# Patient Record
Sex: MALE | Race: BLACK OR AFRICAN AMERICAN | NOT HISPANIC OR LATINO | Employment: FULL TIME | URBAN - METROPOLITAN AREA
[De-identification: names, ages, dates, MRNs, and addresses within clinical notes are randomized per-mention and may not be internally consistent; named-entity substitution may affect disease eponyms.]

---

## 2021-02-10 ENCOUNTER — HOSPITAL ENCOUNTER (OUTPATIENT)
Dept: URGENT CARE | Facility: CLINIC | Age: 46
Discharge: HOME | End: 2021-02-10
Attending: FAMILY MEDICINE

## 2021-06-23 ENCOUNTER — HOSPITAL ENCOUNTER (EMERGENCY)
Facility: HOSPITAL | Age: 46
Discharge: HOME | End: 2021-06-23
Attending: EMERGENCY MEDICINE | Admitting: EMERGENCY MEDICINE

## 2021-06-23 VITALS
BODY MASS INDEX: 31.34 KG/M2 | HEART RATE: 65 BPM | RESPIRATION RATE: 20 BRPM | OXYGEN SATURATION: 100 % | SYSTOLIC BLOOD PRESSURE: 130 MMHG | DIASTOLIC BLOOD PRESSURE: 77 MMHG | WEIGHT: 265.43 LBS | TEMPERATURE: 98.1 F | HEIGHT: 77 IN

## 2021-06-23 DIAGNOSIS — M54.41 CHRONIC RIGHT-SIDED LOW BACK PAIN WITH RIGHT-SIDED SCIATICA: Primary | ICD-10-CM

## 2021-06-23 DIAGNOSIS — G89.29 CHRONIC RIGHT-SIDED LOW BACK PAIN WITH RIGHT-SIDED SCIATICA: Primary | ICD-10-CM

## 2021-06-23 PROCEDURE — 25010000002 METHYLPREDNISOLONE PER 125 MG: Performed by: NURSE PRACTITIONER

## 2021-06-23 PROCEDURE — 25010000002 HYDROMORPHONE 1 MG/ML SOLUTION: Performed by: NURSE PRACTITIONER

## 2021-06-23 PROCEDURE — 96372 THER/PROPH/DIAG INJ SC/IM: CPT

## 2021-06-23 PROCEDURE — 99282 EMERGENCY DEPT VISIT SF MDM: CPT

## 2021-06-23 RX ORDER — TIZANIDINE 4 MG/1
4 TABLET ORAL NIGHTLY PRN
COMMUNITY
End: 2022-05-23 | Stop reason: SDUPTHER

## 2021-06-23 RX ORDER — HYDROCODONE BITARTRATE AND ACETAMINOPHEN 5; 325 MG/1; MG/1
1-2 TABLET ORAL EVERY 6 HOURS PRN
Qty: 10 TABLET | Refills: 0 | Status: CANCELLED | OUTPATIENT
Start: 2021-06-23

## 2021-06-23 RX ORDER — PREDNISONE 50 MG/1
50 TABLET ORAL DAILY
Qty: 5 TABLET | Refills: 0 | Status: SHIPPED | OUTPATIENT
Start: 2021-06-23 | End: 2022-07-19

## 2021-06-23 RX ORDER — PREDNISONE 20 MG/1
40 TABLET ORAL DAILY
Qty: 10 TABLET | Refills: 0 | Status: CANCELLED | OUTPATIENT
Start: 2021-06-23

## 2021-06-23 RX ORDER — DICLOFENAC SODIUM AND MISOPROSTOL 50; 200 MG/1; UG/1
1 TABLET, DELAYED RELEASE ORAL 2 TIMES DAILY
COMMUNITY
End: 2023-01-20

## 2021-06-23 RX ORDER — METHYLPREDNISOLONE SODIUM SUCCINATE 125 MG/2ML
125 INJECTION, POWDER, LYOPHILIZED, FOR SOLUTION INTRAMUSCULAR; INTRAVENOUS ONCE
Status: COMPLETED | OUTPATIENT
Start: 2021-06-23 | End: 2021-06-23

## 2021-06-23 RX ORDER — HYDROCODONE BITARTRATE AND ACETAMINOPHEN 7.5; 325 MG/1; MG/1
1 TABLET ORAL EVERY 6 HOURS PRN
Qty: 12 TABLET | Refills: 0 | Status: SHIPPED | OUTPATIENT
Start: 2021-06-23 | End: 2022-07-19

## 2021-06-23 RX ADMIN — HYDROMORPHONE HYDROCHLORIDE 1 MG: 1 INJECTION, SOLUTION INTRAMUSCULAR; INTRAVENOUS; SUBCUTANEOUS at 21:53

## 2021-06-23 RX ADMIN — METHYLPREDNISOLONE SODIUM SUCCINATE 125 MG: 125 INJECTION, POWDER, FOR SOLUTION INTRAMUSCULAR; INTRAVENOUS at 21:54

## 2022-05-23 ENCOUNTER — HOSPITAL ENCOUNTER (EMERGENCY)
Facility: HOSPITAL | Age: 47
Discharge: HOME | End: 2022-05-23
Attending: EMERGENCY MEDICINE | Admitting: EMERGENCY MEDICINE

## 2022-05-23 VITALS
HEIGHT: 75 IN | BODY MASS INDEX: 34.57 KG/M2 | RESPIRATION RATE: 18 BRPM | TEMPERATURE: 97.8 F | HEART RATE: 91 BPM | SYSTOLIC BLOOD PRESSURE: 123 MMHG | OXYGEN SATURATION: 100 % | DIASTOLIC BLOOD PRESSURE: 74 MMHG | WEIGHT: 278 LBS

## 2022-05-23 DIAGNOSIS — G89.29 CHRONIC BILATERAL LOW BACK PAIN WITHOUT SCIATICA: Primary | ICD-10-CM

## 2022-05-23 DIAGNOSIS — M54.50 CHRONIC BILATERAL LOW BACK PAIN WITHOUT SCIATICA: Primary | ICD-10-CM

## 2022-05-23 PROCEDURE — 25010000002 DEXAMETHASONE PER 1 MG: Performed by: NURSE PRACTITIONER

## 2022-05-23 PROCEDURE — 25010000002 KETOROLAC TROMETHAMINE PER 15 MG: Performed by: NURSE PRACTITIONER

## 2022-05-23 PROCEDURE — 96372 THER/PROPH/DIAG INJ SC/IM: CPT

## 2022-05-23 PROCEDURE — 99282 EMERGENCY DEPT VISIT SF MDM: CPT

## 2022-05-23 RX ORDER — DEXAMETHASONE SODIUM PHOSPHATE 10 MG/ML
10 INJECTION INTRAMUSCULAR; INTRAVENOUS ONCE
Status: COMPLETED | OUTPATIENT
Start: 2022-05-23 | End: 2022-05-23

## 2022-05-23 RX ORDER — METHYLPREDNISOLONE 4 MG/1
TABLET ORAL
Qty: 21 TABLET | Refills: 0 | Status: SHIPPED | OUTPATIENT
Start: 2022-05-23 | End: 2022-07-19

## 2022-05-23 RX ORDER — LIDOCAINE 50 MG/G
1 PATCH TOPICAL EVERY 24 HOURS
Qty: 5 PATCH | Refills: 0 | Status: SHIPPED | OUTPATIENT
Start: 2022-05-23 | End: 2022-07-19

## 2022-05-23 RX ORDER — TIZANIDINE 4 MG/1
4 TABLET ORAL NIGHTLY PRN
Qty: 20 TABLET | Refills: 0 | Status: SHIPPED | OUTPATIENT
Start: 2022-05-23 | End: 2022-07-19

## 2022-05-23 RX ORDER — KETOROLAC TROMETHAMINE 10 MG/1
10 TABLET, FILM COATED ORAL EVERY 6 HOURS PRN
Qty: 15 TABLET | Refills: 0 | Status: SHIPPED | OUTPATIENT
Start: 2022-05-23 | End: 2022-07-19

## 2022-05-23 RX ORDER — KETOROLAC TROMETHAMINE 30 MG/ML
60 INJECTION, SOLUTION INTRAMUSCULAR; INTRAVENOUS ONCE
Status: COMPLETED | OUTPATIENT
Start: 2022-05-23 | End: 2022-05-23

## 2022-05-23 RX ADMIN — KETOROLAC TROMETHAMINE 60 MG: 30 INJECTION, SOLUTION INTRAMUSCULAR; INTRAVENOUS at 09:32

## 2022-05-23 RX ADMIN — DEXAMETHASONE SODIUM PHOSPHATE 10 MG: 10 INJECTION INTRAMUSCULAR; INTRAVENOUS at 09:32

## 2022-05-23 NOTE — ED PROVIDER NOTES
Subjective   Pt reports increasing back pain since Friday. He reports the only thing he can recall possibly increasing the pain is moving furniture that day.       History provided by:  Patient  Back Pain  Location:  Lumbar spine  Quality:  Aching  Radiates to:  Does not radiate  Pain severity:  Severe  Pain is:  Same all the time  Onset quality:  Sudden  Duration:  2 days  Timing:  Constant  Progression:  Worsening  Chronicity:  Chronic  Context: not emotional stress, not falling, not jumping from heights, not lifting heavy objects, not MCA, not MVA, not occupational injury, not pedestrian accident, not physical stress, not recent illness, not recent injury and not twisting    Relieved by:  Nothing  Worsened by:  Nothing  Ineffective treatments:  None tried  Associated symptoms: no abdominal pain, no abdominal swelling, no bladder incontinence, no bowel incontinence, no chest pain, no dysuria, no fever, no headaches, no leg pain, no numbness, no paresthesias, no pelvic pain, no perianal numbness, no tingling, no weakness and no weight loss        Review of Systems   Constitutional: Negative for chills, fever and weight loss.   HENT: Negative for congestion, ear pain and sore throat.    Eyes: Negative for pain.   Respiratory: Negative for cough, chest tightness and shortness of breath.    Cardiovascular: Negative for chest pain.   Gastrointestinal: Negative for abdominal pain, bowel incontinence, diarrhea, nausea and vomiting.   Genitourinary: Negative for bladder incontinence, dysuria, flank pain, hematuria and pelvic pain.   Musculoskeletal: Positive for back pain. Negative for joint swelling.   Skin: Negative for pallor.   Neurological: Negative for tingling, seizures, weakness, numbness, headaches and paresthesias.   All other systems reviewed and are negative.      Past Medical History:   Diagnosis Date   • Injury of back     chronic back pain        No Known Allergies    History reviewed. No pertinent surgical  history.    History reviewed. No pertinent family history.    Social History     Socioeconomic History   • Marital status:    Tobacco Use   • Smoking status: Never Smoker   Substance and Sexual Activity   • Alcohol use: Yes     Comment: socially    • Drug use: Never           Objective   Physical Exam  Vitals and nursing note reviewed.   Constitutional:       General: He is not in acute distress.     Appearance: Normal appearance. He is not toxic-appearing.   HENT:      Head: Normocephalic and atraumatic.      Mouth/Throat:      Mouth: Mucous membranes are moist.   Eyes:      General: No scleral icterus.  Cardiovascular:      Rate and Rhythm: Normal rate and regular rhythm.      Pulses: Normal pulses.      Heart sounds: Normal heart sounds.   Pulmonary:      Effort: Pulmonary effort is normal. No respiratory distress.      Breath sounds: Normal breath sounds.   Abdominal:      General: Abdomen is flat.      Palpations: Abdomen is soft.      Tenderness: There is no abdominal tenderness.   Musculoskeletal:         General: Normal range of motion.      Cervical back: Normal range of motion and neck supple.   Skin:     General: Skin is warm and dry.   Neurological:      Mental Status: He is alert and oriented to person, place, and time. Mental status is at baseline.         Procedures           ED Course                                                 MDM  Number of Diagnoses or Management Options  Chronic bilateral low back pain without sciatica: established and worsening  Diagnosis management comments: The patient´s symptoms are consistent with musculoskeletal back pain. The patient is now resting comfortably, feels better, is alert, talkative, interactive and in no distress. The repeat examination is unremarkable and benign. The patient is neurologically intact and is ambulatory in the ED. The patient has no fever, no bowel or bladder incontinence, no saddle anesthesia, and is otherwise alert and well  appearing. The history, physical exam, and diagnostics (if any) do not suggest the presence of acute spinal epidural abscess, acute spinal epidural bleed, cauda equina syndrome, abdominal aortic aneurysm, aortic dissection or other process requiring further testing, treatment or consultation in the emergency department. The vital signs have been stable. The patient's condition is stable and appropriate for discharge. The patient will pursue further outpatient evaluation with the primary care physician or other designated for consulting position as indicated in the discharge instructions.    Risk of Complications, Morbidity, and/or Mortality  Presenting problems: low  Diagnostic procedures: minimal  Management options: low    Patient Progress  Patient progress: stable      Final diagnoses:   Chronic bilateral low back pain without sciatica       ED Disposition  ED Disposition     ED Disposition   Discharge    Condition   Stable    Comment   Pt deemed medically stable for discharge. Pt is alert and oriented x4. Discharge instructions were discussed with pt. Pt verbalizes understanding. Needs met at discharge.                Phillip Esparza, SINDY  [address] [facility] [place] [address]  [phone]    In 3 days           Medication List      New Prescriptions    ketorolac 10 MG tablet  Commonly known as: TORADOL  Take 1 tablet by mouth Every 6 (Six) Hours As Needed for Moderate Pain .     lidocaine 5 %  Commonly known as: LIDODERM  Place 1 patch on the skin as directed by provider Daily. Remove after 12 hours.     methylPREDNISolone 4 MG dose pack  Commonly known as: MEDROL  Take as directed on package instructions.           Where to Get Your Medications      These medications were sent to [id number] - [place], [place] - [address] AT [facility] - [phone] [facility] [phone] FX  [address] [place] [address] [place] [address]    Hours: 24-hours Phone: [phone]   · ketorolac 10 MG  tablet  · lidocaine 5 %  · methylPREDNISolone 4 MG dose pack  · tiZANidine 4 MG tablet          Isak Conde, APRN  05/23/22 3074

## 2022-05-23 NOTE — ED TRIAGE NOTES
"Lower back pain x 2 days. Pt reports \"moving furniture\" on Friday.   Pt has chronic back pain and is treated at the pain center but states the pain has increased over the past two days. Denies any urinary symptoms at this time.   "

## 2022-07-14 ENCOUNTER — OFFICE VISIT (OUTPATIENT)
Dept: ORTHOPEDIC SURGERY | Facility: CLINIC | Age: 47
End: 2022-07-14

## 2022-07-14 VITALS — BODY MASS INDEX: 34.84 KG/M2 | HEIGHT: 75 IN | HEART RATE: 72 BPM | WEIGHT: 280.2 LBS | OXYGEN SATURATION: 98 %

## 2022-07-14 DIAGNOSIS — M25.522 LEFT ELBOW PAIN: Primary | ICD-10-CM

## 2022-07-14 DIAGNOSIS — M24.022 LOOSE BODY IN ELBOW JOINT, LEFT: ICD-10-CM

## 2022-07-14 PROCEDURE — 99203 OFFICE O/P NEW LOW 30 MIN: CPT | Performed by: ORTHOPAEDIC SURGERY

## 2022-07-14 RX ORDER — PREGABALIN 75 MG/1
75 CAPSULE ORAL 2 TIMES DAILY
COMMUNITY
Start: 2022-06-20

## 2022-07-14 RX ORDER — TRAMADOL HYDROCHLORIDE 50 MG/1
50 TABLET ORAL 3 TIMES DAILY PRN
COMMUNITY
Start: 2022-06-20

## 2022-07-14 NOTE — PROGRESS NOTES
"Chief Complaint  Initial Evaluation of the Left Elbow     Subjective      Devon Diaz presents to [facility] for an evaluation of left elbow. Patient has been having left elbow pain for 15 years. He had a fall during an airborne jump, he went to [facility] during that time and had imaging done back then. At that time he was given stretching to do, this was it. He has been doing these stretches for 15 years. He reports he has difficulty with extension of the elbow. There is occasional numbness in the elbow as well.     No Known Allergies     Social History     Socioeconomic History   • Marital status:    Tobacco Use   • Smoking status: Never Smoker   Substance and Sexual Activity   • Alcohol use: Yes     Comment: socially    • Drug use: Never        Review of Systems     Objective   Vital Signs:   Pulse 72   Ht 190.5 cm (75\")   Wt 127 kg (280 lb 3.2 oz)   SpO2 98%   BMI 35.02 kg/m²       Physical Exam  Constitutional:       Appearance: Normal appearance. Patient is well-developed and normal weight.   HENT:      Head: Normocephalic.      Right Ear: Hearing and external ear normal.      Left Ear: Hearing and external ear normal.      Nose: Nose normal.   Eyes:      Conjunctiva/sclera: Conjunctivae normal.   Cardiovascular:      Rate and Rhythm: Normal rate.   Pulmonary:      Effort: Pulmonary effort is normal.      Breath sounds: No wheezing or rales.   Abdominal:      Palpations: Abdomen is soft.      Tenderness: There is no abdominal tenderness.   Musculoskeletal:      Cervical back: Normal range of motion.   Skin:     Findings: No rash.   Neurological:      Mental Status: Patient is alert and oriented to person, place, and time.   Psychiatric:         Mood and Affect: Mood and affect normal.         Judgment: Judgment normal.       Ortho Exam      LEFT ELBOW: -10 degrees of extension. Full flexion. Non-tender. Good tone of deltoid, biceps, triceps, wrist extensors, and wrist " flexors.  Sensation grossly intact. Neurovascular intact.  Radial pulse 2+, ulnar pulse 2+. No swelling, skin discoloration or atrophy.       Procedures      Imaging Results (Most Recent)     Procedure Component Value Units Date/Time    XR Elbow 2 View Left [[id number]] Resulted: 07/14/22 1415     Updated: 07/14/22 1422           Result Review :     X-Ray Report:  Left elbow(s) X-Ray  Indication: Evaluation of left elbow pain   AP and Lateral view(s)  Findings: no fractures. No arthritis.   Prior studies available for comparison: no       [facility]      6/30/22     MRI LEFT ELBOW     IMPRESSION: 1. Large loose body suspected within the olecranon fossa measuring approximately 2 cm x 1.3 cm. Plain film correlation is recommended. There is a small joint effusion.      2. degenerative spurring in the trochlea and olecranon. There is no definite acute bony abnormality.      3. no evidence of ligament or tendon disruption. There is mild tendinopathy in the common      Assessment and Plan     Diagnoses and all orders for this visit:    1. Left elbow pain (Primary)  -     XR Elbow 2 View Left    2. Loose body in elbow joint, left        Patient referred to Dr. Prajapati.     Call or return if worsening symptoms.    Follow Up     PRN.       Patient was given instructions and counseling regarding his condition or for health maintenance advice. Please see specific information pulled into the AVS if appropriate.     Scribed for Roxanne Vera MD by Martha Segura.  07/14/22   14:35 EDT    I have personally performed the services described in this document as scribed by the above individual and it is both accurate and complete. Roxanne Vera MD 07/14/22

## 2022-07-19 ENCOUNTER — OFFICE VISIT (OUTPATIENT)
Dept: NEUROSURGERY | Facility: CLINIC | Age: 47
End: 2022-07-19

## 2022-07-19 VITALS — HEIGHT: 75 IN | WEIGHT: 277 LBS | BODY MASS INDEX: 34.44 KG/M2

## 2022-07-19 DIAGNOSIS — G89.29 CHRONIC RIGHT-SIDED LOW BACK PAIN WITH RIGHT-SIDED SCIATICA: ICD-10-CM

## 2022-07-19 DIAGNOSIS — M47.27 OSTEOARTHRITIS OF SPINE WITH RADICULOPATHY, LUMBOSACRAL REGION: Primary | ICD-10-CM

## 2022-07-19 DIAGNOSIS — M54.41 CHRONIC RIGHT-SIDED LOW BACK PAIN WITH RIGHT-SIDED SCIATICA: ICD-10-CM

## 2022-07-19 PROCEDURE — 99204 OFFICE O/P NEW MOD 45 MIN: CPT | Performed by: NURSE PRACTITIONER

## 2022-07-19 NOTE — PROGRESS NOTES
"Chief Complaint  Back Pain and Leg Pain    Subjective          Devon Diaz who is a 46 y.o. year old male who presents to [facility] for evaluation of back and leg pain.      The patient complains of pain located in the lumbar spine.  Patients states the pain has been present for several years.  The pain came on gradually and has been worsening since 2015. Pain is primarily in the lower back, into the right leg. The pain scale level is 5.  The pain does radiate. Dermatomes are located on right Lumbar at: S1..  The pain is waxing/waning and described as burning.  The pain is worse in the morning. Patient states prolonged standing, prolonged sitting and prolonged walking makes the pain worse.  Patient states pain medication, NSAIDs, stretching and inversion table makes the pain better. He also uses ice and heat.    Associated Symptoms Include: Numbness and Tingling  Conservative Interventions Include: Physical Therapy that was somewhat effective., Epidural Steroids that were effective., Pain Medications that were somewhat effective. and Pregabalin.     Epidural injections provided transient relief, lasting a week or two.     Was this the result of an injury or accident?: No    He has been in the active  for 20 years. He is no longer in a physically active job, primarily in an office/sedentary work setting. However, he is still required to perform annual physical endurance examination including dead lifts, overhead lifting, sprinting, running, and dragging greater than 45 pounds.     History of Previous Spinal Surgery?: No    Nicotine use: non-smoker    BMI: Body mass index is 34.62 kg/m².      Review of Systems   Musculoskeletal: Positive for arthralgias and back pain.   Neurological: Positive for numbness.   All other systems reviewed and are negative.       Objective   Vital Signs:   Ht 190.5 cm (75\")   Wt 126 kg (277 lb)   BMI 34.62 kg/m²       Physical " Exam  Vitals reviewed.   Constitutional:       Appearance: Normal appearance.   Musculoskeletal:      Lumbar back: Tenderness present. Negative right straight leg raise test and negative left straight leg raise test.      Right hip: No tenderness. Normal range of motion.      Left hip: No tenderness. Normal range of motion.   Neurological:      Mental Status: He is alert and oriented to person, place, and time.      Gait: Gait is intact.      Deep Tendon Reflexes: Strength normal.      Reflex Scores:       Patellar reflexes are 1+ on the right side and 1+ on the left side.       Achilles reflexes are 0 on the right side and 0 on the left side.       Neurologic Exam     Mental Status   Oriented to person, place, and time.   Level of consciousness: alert    Motor Exam   Muscle bulk: normal  Overall muscle tone: normal    Strength   Strength 5/5 throughout.     Sensory Exam   Light touch normal.     Gait, Coordination, and Reflexes     Gait  Gait: normal    Reflexes   Right patellar: 1+  Left patellar: 1+  Right achilles: 0  Left achilles: 0  Right ankle clonus: absent  Left ankle clonus: absent       Result Review :       Data reviewed: Radiologic studies MRI Lumbar Spine on 6/30/22 at [facility]. Multilevel spondylosis. At L5/S1 there is disc degeneration with disc space narrowing, and a right paracentral disc protrusion, decreased from prior exam, resulting in mild right lateral recess stenosis. At L4/5 there is disc degeneration with disc space narrowing and end plate changes, combined with facet changes resulting in mild canal and foraminal narrowing. DDD at L3/4 with mild spinal canal and foraminal narrowing. Degenerative changes in the right SI joint. These are the most notable findings.           Assessment and Plan    Diagnoses and all orders for this visit:    1. Osteoarthritis of spine with radiculopathy, lumbosacral region (Primary)    2. Chronic right-sided low back pain with right-sided  sciatica    Pt presenting with chronic lumbar spine pain with right sided sciatica. We reviewed his MRI Lumbar Spine, demonstrating multilevel spondylosis most significant at L4/5 and L5/S1 without high grade canal or foraminal stenosis. We discussed that surgery would likely not improve his pain symptoms due to multilevel disease. He could consider lumbar facet joint injections and rhizotomy. I also believe he could potentially benefit from spinal cord stimulator to improve both low back and leg pain. We discussed this today.     He continues to serve in the , though works a sedentary job. He is required to perform a fitness exam. We discussed that due to the chronic degenerative changes in his lumbar spine we would advise permanent restrictions of no lifting, pushing, pulling greater than 30 pounds and to limit jarring activities such as running and jogging. These types of over exertional activities increase the chance of injury and progression in degenerative changes of the lumbar spine. He has been given a letter for these permanent restrictions.    He will follow up in our office as needed.      I spent 45 minutes caring for Devon on this date of service. This time includes time spent by me in the following activities:preparing for the visit, reviewing tests, obtaining and/or reviewing a separately obtained history, performing a medically appropriate examination and/or evaluation , counseling and educating the patient/family/caregiver, documenting information in the medical record and independently interpreting results and communicating that information with the patient/family/caregiver.    Follow Up   Return if symptoms worsen or fail to improve.  Patient was given instructions and counseling regarding his condition or for health maintenance advice.     -Consider lumbar facet joint injections and rhizotomy  -Permanent restrictions of no lifting, pushing, pulling, greater than 30 pounds, avoid jarring  activities such as running/jogging  -Follow up as needed

## 2022-07-19 NOTE — PATIENT INSTRUCTIONS
-Consider lumbar facet joint injections and rhizotomy  -Permanent restrictions of no lifting, pushing, pulling, greater than 30 pounds, avoid jarring activities such as running/jogging  -Follow up as needed

## 2022-10-12 ENCOUNTER — OFFICE VISIT (OUTPATIENT)
Dept: SLEEP MEDICINE | Facility: HOSPITAL | Age: 47
End: 2022-10-12

## 2022-10-12 VITALS
WEIGHT: 284.8 LBS | SYSTOLIC BLOOD PRESSURE: 159 MMHG | BODY MASS INDEX: 33.63 KG/M2 | HEIGHT: 77 IN | DIASTOLIC BLOOD PRESSURE: 96 MMHG | OXYGEN SATURATION: 96 % | HEART RATE: 75 BPM

## 2022-10-12 DIAGNOSIS — G47.10 HYPERSOMNIA: ICD-10-CM

## 2022-10-12 DIAGNOSIS — R06.83 SNORING: ICD-10-CM

## 2022-10-12 DIAGNOSIS — G47.8 NON-RESTORATIVE SLEEP: ICD-10-CM

## 2022-10-12 DIAGNOSIS — E66.9 CLASS 1 OBESITY: ICD-10-CM

## 2022-10-12 DIAGNOSIS — F51.04 PSYCHOPHYSIOLOGICAL INSOMNIA: ICD-10-CM

## 2022-10-12 DIAGNOSIS — G47.30 OBSERVED SLEEP APNEA: Primary | ICD-10-CM

## 2022-10-12 PROCEDURE — 99244 OFF/OP CNSLTJ NEW/EST MOD 40: CPT | Performed by: INTERNAL MEDICINE

## 2022-10-12 PROCEDURE — G0463 HOSPITAL OUTPT CLINIC VISIT: HCPCS

## 2022-10-12 RX ORDER — IBUPROFEN 600 MG/1
600 TABLET ORAL EVERY 6 HOURS
COMMUNITY
Start: 2022-08-19

## 2022-10-12 RX ORDER — HYDROCODONE BITARTRATE AND ACETAMINOPHEN 5; 325 MG/1; MG/1
TABLET ORAL
COMMUNITY
Start: 2022-08-31 | End: 2023-01-20

## 2022-10-12 RX ORDER — AMITRIPTYLINE HYDROCHLORIDE 100 MG/1
25 TABLET, FILM COATED ORAL
COMMUNITY
Start: 2022-09-06 | End: 2023-02-07

## 2022-10-12 RX ORDER — CHLORHEXIDINE GLUCONATE 0.12 MG/ML
RINSE ORAL
COMMUNITY
Start: 2022-08-19 | End: 2023-01-20

## 2022-10-12 NOTE — PROGRESS NOTES
[facility]  [address] [place] [address]  [place]   [place] [address]  Phone: [phone]  Fax: [phone]      Devon Diaz  [id number]   1975  46 y.o.  male      Referring physician/provider and PCP Emile Perdomo MD    Type of service: Initial Sleep Medicine Consult.  Date of service: 10/12/2022      Chief Complaint   Patient presents with   • Witnessed Apnea   • Snoring   • Non-restorative Sleep   • Fatigue   • Dry Mouth   • Daytime Sleepiness   • Obesity       History of present illness;  Thank you for asking to see Devon Diaz, 46 y.o.. The patient was seen today on 10/12/2022 at [facility].  The patient presents today with symptoms of snoring, non-restorative sleep and witnessed apneas. The symptoms are present for 5 to 7 years and they are persistent in nature.  The snoring is present in all positions and it is loud.  Has no history of prior sleep evaluation and sleep studies. Patient has no prior surgery namely tonsillectomy, nasal surgery and UPPP.     Is in the  but works as a .  He also has insomnia for which she is taking Elavil.  He is a short sleeper.    Patient gives the following sleep history.  Sleep schedule:  Bedtime: 11 PM  Wake time: 5 AM  Normally takes about 30-60 minutes to fall asleep  Average hours of sleep 4-5  Number of naps per day none  Symptoms   In addition to snoring, nonrestorative sleep and witnessed apneas patient gives the following associated symptoms.  Have you ever awakened gasping for breath, coughing, choking: Yes   Change in weight,  Yes gained 30 pounds  Morning headaches  Yes   Awaken with a sore throat or dry mouth  Yes   Leg jerking at night:  Yes   Crawly feeling/urge sensation to move in the legs: No   Teeth grinding:Yes   Have you ever awakened at night with a sour taste or burning sensation in your chest:  No   Do you have muscle weakness with laughing or anger or sleep paralysis:  No   Have you ever  "felt paralyzed while going to sleep or waking up:  No   Sleepwalking, nightmares, No   Nocturia (urination at night): 3 times per night  Memory Problem:No     MEDICAL CONDITIONS (PMH)  1. Insomnia  2. Neuropathic pain    Social history:  Do you drive a commercial vehicle:  No   Shift work:  No   Tobacco use:  No   Alcohol use: 2 per week  Caffeinated drinks: 1    Family Hx (your parents and siblings)  1. No history of sleep apnea    Medications: reviewed    Review of systems:  Sleep: Positve for snoring,witnessed apnea and daytime excessive sleepiness with [place] Sleepiness Scale of Total score: 19   Kidney/ Bladder  Difficulty In Urination: positive  Bed Wetting: negative  Frequent Urination: positive  HEENT  Recurrent Nose Bleeds: negative  Ear pain: negative  Sores In Mouth: negative  Persistent Hoarseness: negative  Nasal Congestion: positive  Post Nasal Drip: negative  Musculoskeletal:  Neck Pain: negative  Temporomandibular Joint Pain: positive  General:  Fever: negative  Fatigue: positive  Cardiovascular:  Irregular Heartbeat: negative  Swollen Ankles Or Legs: negative  Respiratory:  Shortness Of Breath: negative  Wheezing: negative  Neuro/Paych:  Fainting Spells: negative  Dizziness: negative  Anxiety: positive  Depression: positive  Gastrointestinal:  Problem Swallowing: negative  Frequent Heartburn: negative  Abdominal Bloating: negative  Skin:  Rash: negative  Change In Nails: negative  Endocrine:  Excessive Thirst: negative  Always Too Cold: negative  Always Too Warm: negative  Hem/Lymphatic:  Swollen Glands: negative  Burses/ Bleeds Easily: negative    Physical exam:  CONSTITUTINONAL:  Vitals:    10/12/22 0900   BP: 159/96   Pulse: 75   SpO2: 96%   Weight: 129 kg (284 lb 12.8 oz)   Height: 195.6 cm (77\")    Body mass index is 33.77 kg/m².   HEAD: atraumatic, normocephalic   EYES:pupils are round, equal and reacting to light and accommodation, conjunctiva normal  NOSE:no nasal septal defects, nasal " passages are clear, no nasal polyps,   THROAT: tonsils are not enlarged, tongue normal size, oral airway Mallampati class 2  NECK:Neck Circumference: 19 inches, trachea is in the midline, thyroid not enlarged  RESPIRATORY SYSTEM: Breath sounds are normal, there are no wheezes  CARDIOVASULAR SYSTEM: Heart sounds are regular rhythm and normal rate, there are no murmurs or thrills, no edema  GASTROINTESTINAL: Soft and non-tender,liver not enlarged, no evidence of ascites  MUSCULOSKELETAL SYSTEM: Full range of motion's in all the 4 extremities, neck movement not restricted, temporomandibular joint movement normal and no tenderness  SKIN: Warm and moist, no cyanosis, no clubbing,  NEUROLOGICAL SYSTEM: Oriented x 3, no gross neurological defects, No speech defect, gait is normal  PSYCHIATRIC SYSTEM: Mood is normal, thought content is normal      Assessment and plan:  · Witnessed apneas,(R06.81) patient's symptoms and examination is consistent with sleep apnea (G47.30). I have talked to the patient about the signs and symptoms of sleep apnea. In addition, I have also discussed pathophysiology of sleep apnea.  I also discussed the complications of untreated sleep apnea including effects on hypertension, diabetes mellitus and nonrestorative sleep with hypersomnia which can increase risk for motor vehicle accidents.  Untreated sleep apnea is also a risk factor for development of atrial fibrillation, pulmonary hypertension and stroke.  Discussed in detail of various testing methods including home-based and lab based sleep studies.  Based on history and physical examination and other comorbidities the most appropriate study is home sleep test.  The order for the sleep study is placed in [facility].  The test will be scheduled after approval from insurance. Treatment and management will be discussed after the test is completed.  Patient was given opportunity to ask questions and all the questions were answered.   · Snoring (R06.83),  snoring is the sound created by turbulent airflow vibrating upper airway soft tissue due to limitation of inspiratory airflow. I have also discussed factors affecting snoring including sleep deprivation, sleeping on the back and alcohol ingestion. To minimize snoring, patient is advised to have adequate sleep, sleep on the side and avoid alcohol and sedative medications before bedtime  · Daytime excessive sleepiness .  It was assessed with [place] Sleepiness Scale of Total score: 19.  There are many causes for daytime excessive sleepiness including sleep depression, shiftwork syndrome, depression and other medical disorders including heart, kidney and liver failure.  The most serious cause of excessive sleepiness is due to neurological conditions like narcolepsy/cataplexy.  But the most common cause of excessive sleepiness is due to sleep apnea with frequent awakenings during sleep time.  I have discussed safety of driving and to remain vigilant while driving.  · Obesity 1, patient's BMI is Body mass index is 33.77 kg/m².. I have discussed the relationship between weight and sleep apnea.There is direct correlation between weight and severity of sleep apnea.  Weight reduction is encouraged, as it is going to reduce the severity of sleep apnea. I have also discussed with the patient diet and exercise to achieve ideal body weight.  · Psychophysiological insomnia, talk to the patient about the sleep hygiene.    I have also discussed with the patient the following  • Sleep hygiene: Maintaining a regular bedtime and wake time, not to watch television or work in bed, limit caffeine-containing beverages before bed time and avoid naps during the day  • Adequate amount of sleep.  Generally most people needs about 7 to 8 hours of sleep.    Return for 31 to 90 days after PAP setup with down load..  Patient's questions were answered      I once again thank you for asking me to see this patient in consultation and I have  forwarded my opinion and treatment plan.  Please do not hesitate to call me if you have any questions.     Carissa Patino MD  Sleep Medicine  Medical Director, [facility]  10/12/2022 ,

## 2022-12-08 ENCOUNTER — HOSPITAL ENCOUNTER (OUTPATIENT)
Dept: SLEEP MEDICINE | Facility: HOSPITAL | Age: 47
Discharge: HOME | End: 2022-12-08
Admitting: INTERNAL MEDICINE

## 2022-12-08 DIAGNOSIS — G47.30 OBSERVED SLEEP APNEA: ICD-10-CM

## 2022-12-08 DIAGNOSIS — E66.9 CLASS 1 OBESITY: ICD-10-CM

## 2022-12-08 DIAGNOSIS — G47.8 NON-RESTORATIVE SLEEP: ICD-10-CM

## 2022-12-08 DIAGNOSIS — G47.10 HYPERSOMNIA: ICD-10-CM

## 2022-12-08 DIAGNOSIS — R06.83 SNORING: ICD-10-CM

## 2022-12-08 DIAGNOSIS — F51.04 PSYCHOPHYSIOLOGICAL INSOMNIA: ICD-10-CM

## 2022-12-08 PROCEDURE — 95806 SLEEP STUDY UNATT&RESP EFFT: CPT

## 2022-12-12 DIAGNOSIS — F51.04 PSYCHOPHYSIOLOGICAL INSOMNIA: ICD-10-CM

## 2022-12-12 DIAGNOSIS — G47.8 NON-RESTORATIVE SLEEP: ICD-10-CM

## 2022-12-12 DIAGNOSIS — E66.9 CLASS 1 OBESITY: ICD-10-CM

## 2022-12-12 DIAGNOSIS — G47.10 HYPERSOMNIA: ICD-10-CM

## 2022-12-12 DIAGNOSIS — G47.30 OBSERVED SLEEP APNEA: Primary | ICD-10-CM

## 2022-12-12 DIAGNOSIS — R06.83 SNORING: ICD-10-CM

## 2022-12-12 RX ORDER — ZOLPIDEM TARTRATE 5 MG/1
5 TABLET ORAL TAKE AS DIRECTED
Qty: 2 TABLET | Refills: 0 | Status: SHIPPED | OUTPATIENT
Start: 2022-12-12 | End: 2023-02-07

## 2023-01-04 ENCOUNTER — HOSPITAL ENCOUNTER (OUTPATIENT)
Dept: SLEEP MEDICINE | Facility: HOSPITAL | Age: 48
Discharge: HOME | End: 2023-01-04
Admitting: INTERNAL MEDICINE
Payer: OTHER GOVERNMENT

## 2023-01-04 DIAGNOSIS — E66.9 CLASS 1 OBESITY: ICD-10-CM

## 2023-01-04 DIAGNOSIS — G47.8 NON-RESTORATIVE SLEEP: ICD-10-CM

## 2023-01-04 DIAGNOSIS — G47.10 HYPERSOMNIA: ICD-10-CM

## 2023-01-04 DIAGNOSIS — F51.04 PSYCHOPHYSIOLOGICAL INSOMNIA: ICD-10-CM

## 2023-01-04 DIAGNOSIS — G47.30 OBSERVED SLEEP APNEA: ICD-10-CM

## 2023-01-04 DIAGNOSIS — R06.83 SNORING: ICD-10-CM

## 2023-01-04 PROCEDURE — 95810 POLYSOM 6/> YRS 4/> PARAM: CPT

## 2023-01-04 PROCEDURE — 95810 POLYSOM 6/> YRS 4/> PARAM: CPT | Performed by: INTERNAL MEDICINE

## 2023-01-05 VITALS
HEART RATE: 68 BPM | DIASTOLIC BLOOD PRESSURE: 88 MMHG | HEART RATE: 63 BPM | HEART RATE: 67 BPM | RESPIRATION RATE: 15 BRPM | OXYGEN SATURATION: 97 % | TEMPERATURE: 97.2 F | RESPIRATION RATE: 16 BRPM | SYSTOLIC BLOOD PRESSURE: 116 MMHG | TEMPERATURE: 98.3 F | RESPIRATION RATE: 17 BRPM | HEART RATE: 52 BPM | SYSTOLIC BLOOD PRESSURE: 121 MMHG | DIASTOLIC BLOOD PRESSURE: 79 MMHG | OXYGEN SATURATION: 96 % | SYSTOLIC BLOOD PRESSURE: 124 MMHG | OXYGEN SATURATION: 100 % | OXYGEN SATURATION: 98 % | WEIGHT: 276 LBS | HEART RATE: 60 BPM | RESPIRATION RATE: 18 BRPM | HEART RATE: 57 BPM | HEART RATE: 62 BPM | HEART RATE: 69 BPM | DIASTOLIC BLOOD PRESSURE: 63 MMHG | SYSTOLIC BLOOD PRESSURE: 118 MMHG | SYSTOLIC BLOOD PRESSURE: 113 MMHG | DIASTOLIC BLOOD PRESSURE: 83 MMHG | SYSTOLIC BLOOD PRESSURE: 132 MMHG | SYSTOLIC BLOOD PRESSURE: 115 MMHG | HEIGHT: 77 IN | SYSTOLIC BLOOD PRESSURE: 125 MMHG | DIASTOLIC BLOOD PRESSURE: 76 MMHG

## 2023-01-06 ENCOUNTER — AMBULATORY SURGICAL CENTER (AMBULATORY)
Dept: URBAN - METROPOLITAN AREA SURGERY 17 | Facility: SURGERY | Age: 48
End: 2023-01-06
Payer: OTHER GOVERNMENT

## 2023-01-06 DIAGNOSIS — Z12.11 ENCOUNTER FOR SCREENING FOR MALIGNANT NEOPLASM OF COLON: ICD-10-CM

## 2023-01-06 PROCEDURE — 45378 DIAGNOSTIC COLONOSCOPY: CPT | Mod: 33 | Performed by: INTERNAL MEDICINE

## 2023-01-20 ENCOUNTER — OFFICE VISIT (OUTPATIENT)
Dept: CARDIOLOGY | Facility: CLINIC | Age: 48
End: 2023-01-20
Payer: OTHER GOVERNMENT

## 2023-01-20 VITALS
HEIGHT: 77 IN | SYSTOLIC BLOOD PRESSURE: 143 MMHG | DIASTOLIC BLOOD PRESSURE: 81 MMHG | WEIGHT: 280.8 LBS | HEART RATE: 67 BPM | BODY MASS INDEX: 33.16 KG/M2

## 2023-01-20 DIAGNOSIS — R07.9 CHEST PAIN, UNSPECIFIED TYPE: Primary | ICD-10-CM

## 2023-01-20 DIAGNOSIS — R06.02 SHORTNESS OF BREATH: ICD-10-CM

## 2023-01-20 PROCEDURE — 99203 OFFICE O/P NEW LOW 30 MIN: CPT | Performed by: INTERNAL MEDICINE

## 2023-01-20 RX ORDER — PRAZOSIN HYDROCHLORIDE 1 MG/1
CAPSULE ORAL
COMMUNITY
Start: 2022-11-30

## 2023-01-20 NOTE — PROGRESS NOTES
Chief Complaint  Establish Care, Shortness of Breath, Chest Pain, Palpitations, Dizziness, Extremity Weakness, and Fatigue    Subjective        Devon Diaz presents to [facility] CARDIOLOGY  History of present illness:    Patient is a 47-year-old male who presents with chest pain, palpitations and shortness of breath.  He states that the symptoms have been going on since 2006 and he saw a doctor at that time.  No apparent test were done at that time.  He states the chest pain is in his left chest.  He notes no real nausea or vomiting.  It occurs if he sitting down or if he is standing but leaning forward.  He states when he exercises (patient is in the [employer]) he does not notice it at all.  The palpitations occur at the same time along with the shortness of breath.  He states he checks his blood pressure at home and it runs just in the borderline range.  He is watching his salt.  He denies any previous tobacco use.  He notes occasional alcohol.  Mother and father with no coronary artery disease.      Past Medical History:   Diagnosis Date   • Injury of back     chronic back pain          History reviewed. No pertinent surgical history.       Social History     Socioeconomic History   • Marital status:    Tobacco Use   • Smoking status: Never   • Smokeless tobacco: Never   Substance and Sexual Activity   • Alcohol use: Yes     Comment: socially    • Drug use: Never         Family History   Problem Relation Age of Onset   • Heart attack Maternal Grandfather           No Known Allergies         Current Outpatient Medications:   •  amitriptyline (ELAVIL) 100 MG tablet, 25 mg., Disp: , Rfl:   •  ibuprofen (ADVIL,MOTRIN) 600 MG tablet, Take 1 tablet by mouth Every 6 (Six) Hours., Disp: , Rfl:   •  prazosin (MINIPRESS) 1 MG capsule, , Disp: , Rfl:   •  pregabalin (LYRICA) 75 MG capsule, Take 75 mg by mouth 2 (Two) Times a Day., Disp: , Rfl:   •  traMADol (ULTRAM) 50 MG tablet, Take 50 mg by mouth 3  "(Three) Times a Day As Needed., Disp: , Rfl:   •  zolpidem (AMBIEN) 5 MG tablet, Take 1 tablet by mouth Take As Directed for 2 doses. Bring the medication to the sleep lab. DO NOT USE AT HOME, Disp: 2 tablet, Rfl: 0      ROS:  Cardiac review of systems positive for atypical chest pain/palpitation/shortness of breath    Objective     /81   Pulse 67   Ht 195.6 cm (77\")   Wt 127 kg (280 lb 12.8 oz)   BMI 33.30 kg/m²       General Appearance:   · well developed  · well nourished  HENT:   · oropharynx moist  · lips not cyanotic  Respiratory:  · no respiratory distress  · normal breath sounds  · no rales  Cardiovascular:  · no jugular venous distention  · regular rhythm  · S1 normal, S2 normal  · no S3, no S4   · no murmur  · no rub, no thrill  · No carotid bruit  · pedal pulses normal  · lower extremity edema: none    Musculoskeletal:  · no clubbing of fingers.   · normocephalic, head atraumatic  Skin:   · warm, dry  Psychiatric:  · judgement and insight appropriate  · normal mood and affect    ECHO:    STRESS:    CATH:  No results found for this or any previous visit.    BMP:   No results found for: GLUCOSE, BUN, CREATININE, NA, K, CL, CO2, CALCIUM, BCR, ANIONGAP, EGFR  LIPIDS:  No results found for: CHOL, TRIG, HDL, LDL, VLDL, LDLHDL      Procedures             ASSESSMENT:  Encounter Diagnoses   Name Primary?   • Chest pain, unspecified type Yes   • Shortness of breath          PLAN:    1.  Patient's chest pain/shortness of breath all occur together.  They occur when he is sitting or if he is standing and leaning forward.  He is very active and when he exercises in the [employer] he does not notice any symptoms at all.  The symptoms have been going on since 2006.  This is very atypical for a cardiac source and I do not think any cardiac testing is required.    2.  I have asked the patient to see a GI doctor to see if this could possibly be due to reflux or hiatal hernia.  3.  Patient's blood pressure is monitored " at home and runs in the borderline range.  He is trying just diet and exercise.  4.  I do feel like we could just see the patient back as needed.          Patient was given instructions and counseling regarding his condition or for health maintenance advice. Please see specific information pulled into the AVS if appropriate.         Phillip Lala MD   1/20/2023  16:38 EST

## 2023-02-07 ENCOUNTER — HOSPITAL ENCOUNTER (EMERGENCY)
Facility: HOSPITAL | Age: 48
Discharge: HOME | End: 2023-02-07
Attending: EMERGENCY MEDICINE | Admitting: EMERGENCY MEDICINE
Payer: OTHER GOVERNMENT

## 2023-02-07 VITALS
RESPIRATION RATE: 20 BRPM | DIASTOLIC BLOOD PRESSURE: 79 MMHG | TEMPERATURE: 98.1 F | WEIGHT: 275.57 LBS | OXYGEN SATURATION: 98 % | HEIGHT: 77 IN | BODY MASS INDEX: 32.54 KG/M2 | SYSTOLIC BLOOD PRESSURE: 130 MMHG | HEART RATE: 62 BPM

## 2023-02-07 DIAGNOSIS — J06.9 VIRAL URI WITH COUGH: Primary | ICD-10-CM

## 2023-02-07 LAB
FLUAV AG NPH QL: NEGATIVE
FLUBV AG NPH QL IA: NEGATIVE
S PYO AG THROAT QL: NEGATIVE
SARS-COV-2 RNA RESP QL NAA+PROBE: NOT DETECTED

## 2023-02-07 PROCEDURE — 99283 EMERGENCY DEPT VISIT LOW MDM: CPT

## 2023-02-07 PROCEDURE — 87804 INFLUENZA ASSAY W/OPTIC: CPT | Performed by: EMERGENCY MEDICINE

## 2023-02-07 PROCEDURE — U0004 COV-19 TEST NON-CDC HGH THRU: HCPCS | Performed by: EMERGENCY MEDICINE

## 2023-02-07 PROCEDURE — 87081 CULTURE SCREEN ONLY: CPT | Performed by: EMERGENCY MEDICINE

## 2023-02-07 PROCEDURE — 87880 STREP A ASSAY W/OPTIC: CPT | Performed by: EMERGENCY MEDICINE

## 2023-02-07 PROCEDURE — C9803 HOPD COVID-19 SPEC COLLECT: HCPCS | Performed by: EMERGENCY MEDICINE

## 2023-02-07 RX ORDER — SERTRALINE HYDROCHLORIDE 25 MG/1
25 TABLET, FILM COATED ORAL DAILY
COMMUNITY

## 2023-02-07 RX ORDER — GUAIFENESIN AND DEXTROMETHORPHAN HYDROBROMIDE 600; 30 MG/1; MG/1
2 TABLET, EXTENDED RELEASE ORAL 2 TIMES DAILY PRN
Qty: 30 TABLET | Refills: 0 | Status: SHIPPED | OUTPATIENT
Start: 2023-02-07

## 2023-02-07 NOTE — ED PROVIDER NOTES
Time: 8:58 AM EST  Date of encounter:  2/7/2023  Independent Historian/Clinical History and Information was obtained by:   Patient  Chief Complaint: sore throat    History is limited by: N/A    History of Present Illness:  Patient is a 47 y.o. year old male who presents to the emergency department for evaluation of scratchy throat x 2 days. Pt complains of decreased appetite, cough, sinus drainage, fatigue. States next door neighbor positive for strep throat. Denies fevers, chills.       Patient Care Team  Primary Care Provider: Emile Perdomo MD    Past Medical History:     No Known Allergies  Past Medical History:   Diagnosis Date   • Anxiety    • HTN (hypertension)    • Injury of back     chronic back pain    • Migraine    • Nerve pain      History reviewed. No pertinent surgical history.  Family History   Problem Relation Age of Onset   • Heart attack Maternal Grandfather        Home Medications:  Prior to Admission medications    Medication Sig Start Date End Date Taking? Authorizing Provider   ibuprofen (ADVIL,MOTRIN) 600 MG tablet Take 1 tablet by mouth Every 6 (Six) Hours. 8/19/22   Mj Lux MD   prazosin (MINIPRESS) 1 MG capsule  11/30/22   Mj Lux MD   pregabalin (LYRICA) 75 MG capsule Take 75 mg by mouth 2 (Two) Times a Day. 6/20/22   Mj Lux MD   sertraline (ZOLOFT) 25 MG tablet Take 25 mg by mouth Daily.    Mj Lux MD   traMADol (ULTRAM) 50 MG tablet Take 50 mg by mouth 3 (Three) Times a Day As Needed. 6/20/22   Mj Lux MD   amitriptyline (ELAVIL) 100 MG tablet 25 mg. 9/6/22 2/7/23  Mj Lux MD   zolpidem (AMBIEN) 5 MG tablet Take 1 tablet by mouth Take As Directed for 2 doses. Bring the medication to the sleep lab. DO NOT USE AT HOME 12/12/22 2/7/23  Carissa Patino MD        Social History:   Social History     Tobacco Use   • Smoking status: Never   • Smokeless tobacco: Never   Substance Use Topics   •  "Alcohol use: Yes     Comment: socially    • Drug use: Never         Review of Systems:  Review of Systems   Constitutional: Positive for appetite change and fatigue.   HENT: Positive for sinus pain and sore throat.    Respiratory: Positive for cough.         Physical Exam:  /79 (BP Location: Right arm, Patient Position: Sitting)   Pulse 62   Temp 98.1 °F (36.7 °C) (Oral)   Resp 20   Ht 195.6 cm (77\")   Wt 125 kg (275 lb 9.2 oz)   SpO2 98%   BMI 32.68 kg/m²     Physical Exam  Vitals and nursing note reviewed.   Constitutional:       General: He is not in acute distress.     Appearance: Normal appearance. He is not toxic-appearing.   HENT:      Head: Normocephalic and atraumatic.      Mouth/Throat:      Mouth: Mucous membranes are moist.   Eyes:      General: No scleral icterus.  Cardiovascular:      Rate and Rhythm: Normal rate and regular rhythm.      Pulses: Normal pulses.      Heart sounds: Normal heart sounds.   Pulmonary:      Effort: Pulmonary effort is normal. No respiratory distress.      Breath sounds: Normal breath sounds.   Abdominal:      General: Abdomen is flat.      Palpations: Abdomen is soft.      Tenderness: There is no abdominal tenderness.   Musculoskeletal:         General: Normal range of motion.      Cervical back: Normal range of motion and neck supple.   Skin:     General: Skin is warm and dry.   Neurological:      Mental Status: He is alert and oriented to person, place, and time. Mental status is at baseline.                  Procedures:  Procedures      Medical Decision Making:      Comorbidities that affect care:    Hypertension    External Notes reviewed:    None      The following orders were placed and all results were independently analyzed by me:  Orders Placed This Encounter   Procedures   • COVID-19,APTIMA PANTHER(KEELY), ELISA/ MANNY, NP/OP SWAB IN UTM/VTM/SALINE TRANSPORT MEDIA,24 HR TAT - Swab, Nasopharynx   • Influenza Antigen, Rapid - Swab, Nasopharynx   • Rapid " Strep A Screen - Swab, Throat   • Beta Strep Culture, Throat - Swab, Throat       Medications Given in the Emergency Department:  Medications - No data to display     ED Course:         Labs:    Lab Results (last 24 hours)     Procedure Component Value Units Date/Time    COVID-19,APTIMA PANTHER(KEELY),BH ELISA/ MANNY, NP/OP SWAB IN UTM/VTM/SALINE TRANSPORT MEDIA,24 HR TAT - Swab, Nasopharynx [[id number]] Collected: 02/07/23 0801    Specimen: Swab from Nasopharynx Updated: 02/07/23 0805    Influenza Antigen, Rapid - Swab, Nasopharynx [[id number]]  (Normal) Collected: 02/07/23 0801    Specimen: Swab from Nasopharynx Updated: 02/07/23 0830     Influenza A Ag, EIA Negative     Influenza B Ag, EIA Negative    Rapid Strep A Screen - Swab, Throat [[id number]]  (Normal) Collected: 02/07/23 0801    Specimen: Swab from Throat Updated: 02/07/23 0820     Strep A Ag Negative    Beta Strep Culture, Throat - Swab, Throat [[id number]] Collected: 02/07/23 0801    Specimen: Swab from Throat Updated: 02/07/23 0820           Imaging:    No Radiology Exams Resulted Within Past 24 Hours      Differential Diagnosis and Discussion:    Sore Throat: Differential diagnosis includes but is not limited to bacterial infection, viral infection, inhaled irritants, sinus drainage, thyroiditis, epiglottitis, and retropharyngeal abscess.    All labs were reviewed and analyzed by me.    [facility]         Patient Care Considerations:    LABS: I considered ordering labs, however pt vital stable and symptoms limited.       Consultants/Shared Management Plan:    None    Social Determinants of Health:    Patient is independent, reliable, and has access to care.       Disposition and Care Coordination:    Discharged: The patient is suitable and stable for discharge with no need for consideration of observation or admission.        Final diagnoses:   Viral URI with cough        ED Disposition     ED Disposition   Discharge    Condition   Stable    Comment   --              This medical record created using voice recognition software.           Beatrice Gallegos, APRN  02/07/23 0075

## 2023-02-07 NOTE — DISCHARGE INSTRUCTIONS
Drink plenty of fluids.  Take Tylenol or Ibuprofen for fever, pain.  Follow up with your PCP.  Your COVID test is pending. You should have results in 24-48 hours.

## 2023-02-09 LAB — BACTERIA SPEC AEROBE CULT: NORMAL

## 2023-03-20 ENCOUNTER — OFFICE VISIT (OUTPATIENT)
Dept: SLEEP MEDICINE | Facility: HOSPITAL | Age: 48
End: 2023-03-20
Payer: OTHER GOVERNMENT

## 2023-03-20 VITALS
HEART RATE: 57 BPM | DIASTOLIC BLOOD PRESSURE: 88 MMHG | BODY MASS INDEX: 32.39 KG/M2 | WEIGHT: 274.3 LBS | HEIGHT: 77 IN | SYSTOLIC BLOOD PRESSURE: 139 MMHG | OXYGEN SATURATION: 96 %

## 2023-03-20 DIAGNOSIS — R06.83 SNORING: Primary | ICD-10-CM

## 2023-03-20 PROBLEM — G47.30 OBSERVED SLEEP APNEA: Status: RESOLVED | Noted: 2022-10-12 | Resolved: 2023-03-20

## 2023-03-20 PROCEDURE — G0463 HOSPITAL OUTPT CLINIC VISIT: HCPCS

## 2023-03-20 PROCEDURE — 99212 OFFICE O/P EST SF 10 MIN: CPT | Performed by: INTERNAL MEDICINE

## 2023-03-20 RX ORDER — MELOXICAM 15 MG/1
1 TABLET ORAL DAILY PRN
COMMUNITY
Start: 2023-03-13

## 2023-03-20 RX ORDER — DEXTROAMPHETAMINE SACCHARATE, AMPHETAMINE ASPARTATE MONOHYDRATE, DEXTROAMPHETAMINE SULFATE AND AMPHETAMINE SULFATE 2.5; 2.5; 2.5; 2.5 MG/1; MG/1; MG/1; MG/1
CAPSULE, EXTENDED RELEASE ORAL
COMMUNITY
Start: 2023-02-06

## 2023-03-20 RX ORDER — AZELASTINE 1 MG/ML
2 SPRAY, METERED NASAL 2 TIMES DAILY
COMMUNITY
Start: 2023-03-02

## 2023-03-20 RX ORDER — FLUTICASONE PROPIONATE 50 MCG
1 SPRAY, SUSPENSION (ML) NASAL 2 TIMES DAILY
COMMUNITY
Start: 2023-03-02

## 2023-03-20 NOTE — PROGRESS NOTES
"  [address] [address]  Phone: [phone]  Fax: [phone]      SLEEP CLINIC FOLLOW UP PROGRESS NOTE.    Devon Diaz  [id number]   1975  47 y.o.  male      PCP: Emile Perdomo MD      Date of visit: 3/20/2023    Chief Complaint   Patient presents with   • Snoring       HPI:  This is a 47 y.o. years old patient who has a history of snoring and other symptoms of sleep apnea was evaluated recently and underwent sleep test.  Patient is here to discuss the test results and treatment options.  I have discussed with the patient the test results.  To summarize the test shows no evidence of sleep apnea.  The AHI(apnea-hypopnea index) is 0.2 /hr, less than 5.0 is normal.  But the test showed snoring for 30% of sleep time with 75 episodes.  No evidence of oxygen desaturation.  Patient reports that he wakes up choking and gasping for breath    Medication and allergies are reviewed by me and documented in the encounter.     SOCIAL ( habits pertaining to sleep medicine)  History of smoking:No   History of alcohol use:3 per week  Caffeine use: 1     REVIEW OF SYSTEMS:   [place] Sleepiness Scale :Total score: 24   Snoring: Yes   Nasal congestion:No       PHYSICAL EXAMINATION:  CONSTITUTIONAL:  Vitals:    03/20/23 0900   BP: 139/88   Pulse: 57   SpO2: 96%   Weight: 124 kg (274 lb 4.8 oz)   Height: 195.6 cm (77.01\")    Body mass index is 32.52 kg/m².  NOSE: nasal passages are clear, no nasal polyps, septum in the midline.  THROAT: throat is clear,       ASSESSMENT AND PLAN:  · Snoring R 06.83 /upper airway resistance syndrome G 47.39  · Patient is advised to lose weight, Body mass index is 32.52 kg/m².  · Advised to sleep on the side, patient can use body pillow to prevent rollover  · Sleep hygiene, with the regular sleep time and wake time  · Advised to avoid alcohol and other sedative medications  · Patient can also use oral appliance.  I have given " information about the oral appliance.  · Obesity, class 1 with BMI is Body mass index is 32.52 kg/m².. I have discuss the relationship between the weight and sleep apnea. The benefit of weight loss in reducing severity of sleep apnea was discussed. Discussed diet and exercise with the patient to achieve ideal BMI.  · Return if symptoms worsen or fail to improve. . Patient's questions were answered.        Carissa Patino MD  Sleep Medicine  Medical Director for [facility]  3/20/2023

## 2023-06-01 ENCOUNTER — HOSPITAL ENCOUNTER (EMERGENCY)
Facility: HOSPITAL | Age: 48
Discharge: HOME | End: 2023-06-01
Attending: EMERGENCY MEDICINE
Payer: OTHER GOVERNMENT

## 2023-06-01 ENCOUNTER — APPOINTMENT (OUTPATIENT)
Dept: CT IMAGING | Facility: HOSPITAL | Age: 48
End: 2023-06-01
Payer: OTHER GOVERNMENT

## 2023-06-01 VITALS
HEIGHT: 77 IN | WEIGHT: 269.62 LBS | BODY MASS INDEX: 31.84 KG/M2 | TEMPERATURE: 98.6 F | OXYGEN SATURATION: 99 % | HEART RATE: 74 BPM | RESPIRATION RATE: 16 BRPM | DIASTOLIC BLOOD PRESSURE: 76 MMHG | SYSTOLIC BLOOD PRESSURE: 133 MMHG

## 2023-06-01 DIAGNOSIS — K59.00 CONSTIPATION, UNSPECIFIED CONSTIPATION TYPE: Primary | ICD-10-CM

## 2023-06-01 LAB
ALBUMIN SERPL-MCNC: 4 G/DL (ref 3.5–5.2)
ALBUMIN/GLOB SERPL: 1.4 G/DL
ALP SERPL-CCNC: 46 U/L (ref 39–117)
ALT SERPL W P-5'-P-CCNC: 15 U/L (ref 1–41)
ANION GAP SERPL CALCULATED.3IONS-SCNC: 10.5 MMOL/L (ref 5–15)
AST SERPL-CCNC: 22 U/L (ref 1–40)
BASOPHILS # BLD AUTO: 0.01 10*3/MM3 (ref 0–0.2)
BASOPHILS NFR BLD AUTO: 0.2 % (ref 0–1.5)
BILIRUB SERPL-MCNC: 0.3 MG/DL (ref 0–1.2)
BILIRUB UR QL STRIP: NEGATIVE
BUN SERPL-MCNC: 14 MG/DL (ref 6–20)
BUN/CREAT SERPL: 16.1 (ref 7–25)
CALCIUM SPEC-SCNC: 9.2 MG/DL (ref 8.6–10.5)
CHLORIDE SERPL-SCNC: 105 MMOL/L (ref 98–107)
CLARITY UR: CLEAR
CO2 SERPL-SCNC: 23.5 MMOL/L (ref 22–29)
COLOR UR: YELLOW
CREAT SERPL-MCNC: 0.87 MG/DL (ref 0.76–1.27)
DEPRECATED RDW RBC AUTO: 39.9 FL (ref 37–54)
EGFRCR SERPLBLD CKD-EPI 2021: 106.4 ML/MIN/1.73
EOSINOPHIL # BLD AUTO: 0.01 10*3/MM3 (ref 0–0.4)
EOSINOPHIL NFR BLD AUTO: 0.2 % (ref 0.3–6.2)
ERYTHROCYTE [DISTWIDTH] IN BLOOD BY AUTOMATED COUNT: 12 % (ref 12.3–15.4)
GLOBULIN UR ELPH-MCNC: 2.8 GM/DL
GLUCOSE SERPL-MCNC: 148 MG/DL (ref 65–99)
GLUCOSE UR STRIP-MCNC: NEGATIVE MG/DL
HCT VFR BLD AUTO: 43 % (ref 37.5–51)
HGB BLD-MCNC: 14.1 G/DL (ref 13–17.7)
HGB UR QL STRIP.AUTO: NEGATIVE
HOLD SPECIMEN: NORMAL
HOLD SPECIMEN: NORMAL
IMM GRANULOCYTES # BLD AUTO: 0 10*3/MM3 (ref 0–0.05)
IMM GRANULOCYTES NFR BLD AUTO: 0 % (ref 0–0.5)
KETONES UR QL STRIP: NEGATIVE
LEUKOCYTE ESTERASE UR QL STRIP.AUTO: NEGATIVE
LIPASE SERPL-CCNC: 17 U/L (ref 13–60)
LYMPHOCYTES # BLD AUTO: 1.64 10*3/MM3 (ref 0.7–3.1)
LYMPHOCYTES NFR BLD AUTO: 27.7 % (ref 19.6–45.3)
MCH RBC QN AUTO: 29.5 PG (ref 26.6–33)
MCHC RBC AUTO-ENTMCNC: 32.8 G/DL (ref 31.5–35.7)
MCV RBC AUTO: 90 FL (ref 79–97)
MONOCYTES # BLD AUTO: 0.36 10*3/MM3 (ref 0.1–0.9)
MONOCYTES NFR BLD AUTO: 6.1 % (ref 5–12)
NEUTROPHILS NFR BLD AUTO: 3.89 10*3/MM3 (ref 1.7–7)
NEUTROPHILS NFR BLD AUTO: 65.8 % (ref 42.7–76)
NITRITE UR QL STRIP: NEGATIVE
NRBC BLD AUTO-RTO: 0 /100 WBC (ref 0–0.2)
PH UR STRIP.AUTO: 5.5 [PH] (ref 5–8)
PLATELET # BLD AUTO: 291 10*3/MM3 (ref 140–450)
PMV BLD AUTO: 9 FL (ref 6–12)
POTASSIUM SERPL-SCNC: 4 MMOL/L (ref 3.5–5.2)
PROT SERPL-MCNC: 6.8 G/DL (ref 6–8.5)
PROT UR QL STRIP: NEGATIVE
RBC # BLD AUTO: 4.78 10*6/MM3 (ref 4.14–5.8)
SODIUM SERPL-SCNC: 139 MMOL/L (ref 136–145)
SP GR UR STRIP: >1.03 (ref 1–1.03)
UROBILINOGEN UR QL STRIP: ABNORMAL
WBC NRBC COR # BLD: 5.91 10*3/MM3 (ref 3.4–10.8)
WHOLE BLOOD HOLD COAG: NORMAL
WHOLE BLOOD HOLD SPECIMEN: NORMAL

## 2023-06-01 PROCEDURE — 96375 TX/PRO/DX INJ NEW DRUG ADDON: CPT

## 2023-06-01 PROCEDURE — 80053 COMPREHEN METABOLIC PANEL: CPT

## 2023-06-01 PROCEDURE — 25510000001 IOPAMIDOL PER 1 ML

## 2023-06-01 PROCEDURE — 85025 COMPLETE CBC W/AUTO DIFF WBC: CPT

## 2023-06-01 PROCEDURE — 74177 CT ABD & PELVIS W/CONTRAST: CPT

## 2023-06-01 PROCEDURE — 81003 URINALYSIS AUTO W/O SCOPE: CPT | Performed by: EMERGENCY MEDICINE

## 2023-06-01 PROCEDURE — 36415 COLL VENOUS BLD VENIPUNCTURE: CPT

## 2023-06-01 PROCEDURE — 25010000002 ONDANSETRON PER 1 MG

## 2023-06-01 PROCEDURE — 25010000002 KETOROLAC TROMETHAMINE PER 15 MG

## 2023-06-01 PROCEDURE — 83690 ASSAY OF LIPASE: CPT

## 2023-06-01 PROCEDURE — 96374 THER/PROPH/DIAG INJ IV PUSH: CPT

## 2023-06-01 PROCEDURE — 99283 EMERGENCY DEPT VISIT LOW MDM: CPT

## 2023-06-01 RX ORDER — ONDANSETRON 2 MG/ML
4 INJECTION INTRAMUSCULAR; INTRAVENOUS ONCE
Status: COMPLETED | OUTPATIENT
Start: 2023-06-01 | End: 2023-06-01

## 2023-06-01 RX ORDER — MAG HYDROX/ALUMINUM HYD/SIMETH 400-400-40
1 SUSPENSION, ORAL (FINAL DOSE FORM) ORAL ONCE
Status: COMPLETED | OUTPATIENT
Start: 2023-06-01 | End: 2023-06-01

## 2023-06-01 RX ORDER — MAGNESIUM CARB/ALUMINUM HYDROX 105-160MG
296 TABLET,CHEWABLE ORAL ONCE
Status: COMPLETED | OUTPATIENT
Start: 2023-06-01 | End: 2023-06-01

## 2023-06-01 RX ORDER — SODIUM CHLORIDE 0.9 % (FLUSH) 0.9 %
10 SYRINGE (ML) INJECTION AS NEEDED
Status: DISCONTINUED | OUTPATIENT
Start: 2023-06-01 | End: 2023-06-01 | Stop reason: HOSPADM

## 2023-06-01 RX ORDER — KETOROLAC TROMETHAMINE 30 MG/ML
30 INJECTION, SOLUTION INTRAMUSCULAR; INTRAVENOUS ONCE
Status: COMPLETED | OUTPATIENT
Start: 2023-06-01 | End: 2023-06-01

## 2023-06-01 RX ADMIN — BE HEALTH MAGNESIUM CITRATE ORAL SOLUTION - LEMON 296 ML: 1.75 LIQUID ORAL at 14:47

## 2023-06-01 RX ADMIN — KETOROLAC TROMETHAMINE 30 MG: 30 INJECTION, SOLUTION INTRAMUSCULAR; INTRAVENOUS at 11:54

## 2023-06-01 RX ADMIN — GLYCERIN 1 SUPPOSITORY: 2 SUPPOSITORY RECTAL at 15:27

## 2023-06-01 RX ADMIN — SODIUM CHLORIDE 1000 ML: 9 INJECTION, SOLUTION INTRAVENOUS at 11:53

## 2023-06-01 RX ADMIN — ONDANSETRON 4 MG: 2 INJECTION INTRAMUSCULAR; INTRAVENOUS at 11:54

## 2023-06-01 RX ADMIN — IOPAMIDOL 100 ML: 755 INJECTION, SOLUTION INTRAVENOUS at 12:12

## 2023-06-01 NOTE — ED PROVIDER NOTES
"Time: 11:43 AM EDT  Date of encounter:  6/1/2023  Independent Historian/Clinical History and Information was obtained by:   Patient  Chief Complaint   Patient presents with   • Abdominal Pain   • Nausea   • Vomiting   • Constipation       History is limited by: N/A    History of Present Illness:  Patient is a 47 y.o. year old male who presents to the emergency department for evaluation of abdominal pain with associated nausea and constipation.  Patient states that he did have a very small bowel movement today but states \"it was just like passing a bunch of M&Ms\".  Patient states last bowel movement before that was yesterday.  Patient admits to severe nausea but denies any vomiting.  Patient denies any fevers.  Patient does admit to associated symptoms of chills, body aches, and shortness of air.  Patient currently states that his abdominal pain is 10 out of 10.  Patient states the pain begins in the epigastric area and then radiates across the entire abdomen.  (Provider in triage, Yoel Watkins PA-C)    HPI    Patient Care Team  Primary Care Provider: Emile Perdomo MD    Past Medical History:     No Known Allergies  Past Medical History:   Diagnosis Date   • Anxiety    • HTN (hypertension)    • Injury of back     chronic back pain    • Migraine    • Nerve pain      History reviewed. No pertinent surgical history.  Family History   Problem Relation Age of Onset   • Heart attack Maternal Grandfather        Home Medications:  Prior to Admission medications    Medication Sig Start Date End Date Taking? Authorizing Provider   amphetamine-dextroamphetamine XR (ADDERALL XR) 10 MG 24 hr capsule  2/6/23   Mj Lux MD   azelastine (ASTELIN) 0.1 % nasal spray 2 sprays 2 (Two) Times a Day. 3/2/23   Mj Lux MD   fluticasone (FLONASE) 50 MCG/ACT nasal spray 1 spray 2 (Two) Times a Day. shake liquid 3/2/23   Mj Lux MD   guaifenesin-dextromethorphan (MUCINEX DM)  MG tablet " "sustained-release 12 hour tablet Take 2 tablets by mouth 2 (Two) Times a Day As Needed (congestion, cough). 2/7/23   Beatrice Gallegos APRN   ibuprofen (ADVIL,MOTRIN) 600 MG tablet Take 1 tablet by mouth Every 6 (Six) Hours. 8/19/22   Mj Lux MD   meloxicam (MOBIC) 15 MG tablet Take 1 tablet by mouth Daily As Needed. 3/13/23   Mj Lux MD   prazosin (MINIPRESS) 1 MG capsule  11/30/22   Mj Lux MD   pregabalin (LYRICA) 75 MG capsule Take 1 capsule by mouth 2 (Two) Times a Day. 6/20/22   Mj Lux MD   sertraline (ZOLOFT) 25 MG tablet Take 1 tablet by mouth Daily.    Mj Lux MD   traMADol (ULTRAM) 50 MG tablet Take 1 tablet by mouth 3 (Three) Times a Day As Needed. 6/20/22   Mj Lux MD        Social History:   Social History     Tobacco Use   • Smoking status: Never   • Smokeless tobacco: Never   Substance Use Topics   • Alcohol use: Yes     Comment: socially    • Drug use: Never         Review of Systems:  Review of Systems   Constitutional: Positive for chills. Negative for fever.   HENT: Negative for congestion, rhinorrhea and sore throat.    Eyes: Negative for pain and visual disturbance.   Respiratory: Negative for apnea, cough, chest tightness and shortness of breath.    Cardiovascular: Negative for chest pain and palpitations.   Gastrointestinal: Positive for abdominal pain, constipation and diarrhea. Negative for nausea and vomiting.   Genitourinary: Negative for difficulty urinating and dysuria.   Musculoskeletal: Negative for joint swelling and myalgias.   Skin: Negative for color change.   Neurological: Negative for seizures and headaches.   Psychiatric/Behavioral: Negative.    All other systems reviewed and are negative.       Physical Exam:  /76 (BP Location: Left arm, Patient Position: Lying)   Pulse 78   Temp 98.6 °F (37 °C) (Oral)   Resp 16   Ht 195.6 cm (77\")   Wt 122 kg (269 lb 10 oz)   SpO2 99%   BMI 31.97 kg/m² "     Physical Exam  Vitals and nursing note reviewed.   Constitutional:       General: He is not in acute distress.     Appearance: Normal appearance. He is not toxic-appearing.   HENT:      Head: Normocephalic and atraumatic.      Jaw: There is normal jaw occlusion.   Eyes:      General: Lids are normal.      Extraocular Movements: Extraocular movements intact.      Conjunctiva/sclera: Conjunctivae normal.      Pupils: Pupils are equal, round, and reactive to light.   Cardiovascular:      Rate and Rhythm: Normal rate and regular rhythm.      Pulses: Normal pulses.      Heart sounds: Normal heart sounds.   Pulmonary:      Effort: Pulmonary effort is normal. No respiratory distress.      Breath sounds: Normal breath sounds. No wheezing or rhonchi.   Abdominal:      General: Abdomen is flat.      Palpations: Abdomen is soft.      Tenderness: There is no abdominal tenderness. There is no guarding or rebound.   Musculoskeletal:         General: Normal range of motion.      Cervical back: Normal range of motion and neck supple.      Right lower leg: No edema.      Left lower leg: No edema.   Skin:     General: Skin is warm and dry.   Neurological:      Mental Status: He is alert and oriented to person, place, and time. Mental status is at baseline.   Psychiatric:         Mood and Affect: Mood normal.                  Procedures:  Procedures      Medical Decision Making:      Comorbidities that affect care:    None    External Notes reviewed:    Previous Clinic Note: Office visit with sleep medicine for snoring evaluation      The following orders were placed and all results were independently analyzed by me:  Orders Placed This Encounter   Procedures   • CT Abdomen Pelvis With Contrast   • [place] Draw   • Comprehensive Metabolic Panel   • Lipase   • Urinalysis With Microscopic If Indicated (No Culture) - Urine, Clean Catch   • CBC Auto Differential   • NPO Diet NPO Type: Strict NPO   • Undress & Gown   • Insert  Peripheral IV   • CBC & Differential   • Green Top (Gel)   • Lavender Top   • Gold Top - SST   • Light Blue Top       Medications Given in the Emergency Department:  Medications   sodium chloride 0.9 % flush 10 mL (has no administration in time range)   glycerin adult 1 suppository (has no administration in time range)   molasses enema (has no administration in time range)   ondansetron (ZOFRAN) injection 4 mg (4 mg Intravenous Given 6/1/23 1154)   ketorolac (TORADOL) injection 30 mg (30 mg Intravenous Given 6/1/23 1154)   sodium chloride 0.9 % bolus 1,000 mL (0 mL Intravenous Stopped 6/1/23 1408)   iopamidol (ISOVUE-370) 76 % injection 100 mL (100 mL Intravenous Given 6/1/23 1212)   magnesium citrate solution 296 mL (296 mL Oral Given 6/1/23 1447)        ED Course:    The patient was initially evaluated in the triage area where orders were placed. The patient was later dispositioned by Dimitris Garner MD.      The patient was advised to stay for completion of workup which includes but is not limited to communication of labs and radiological results, reassessment and plan. The patient was advised that leaving prior to disposition by a provider could result in critical findings that are not communicated to the patient.     ED Course as of 06/01/23 1519   Th Jun 01, 2023   1144 PROVIDER IN TRIAGE  Patient was evaluated by me in triage, Yoel Watkins PA-C.  Orders were placed and patient is currently awaiting final results and disposition.  [MD]      ED Course User Index  [MD] Yoel Watkins PA-C       Labs:    Lab Results (last 24 hours)     Procedure Component Value Units Date/Time    CBC & Differential [[id number]]  (Abnormal) Collected: 06/01/23 1128    Specimen: Blood Updated: 06/01/23 1139    Narrative:      The following orders were created for panel order CBC & Differential.  Procedure                               Abnormality         Status                     ---------                                -----------         ------                     CBC Auto Differential[[id number]]        Abnormal            Final result                 Please view results for these tests on the individual orders.    Comprehensive Metabolic Panel [[id number]]  (Abnormal) Collected: 06/01/23 1128    Specimen: Blood Updated: 06/01/23 1159     Glucose 148 mg/dL      BUN 14 mg/dL      Creatinine 0.87 mg/dL      Sodium 139 mmol/L      Potassium 4.0 mmol/L      Chloride 105 mmol/L      CO2 23.5 mmol/L      Calcium 9.2 mg/dL      Total Protein 6.8 g/dL      Albumin 4.0 g/dL      ALT (SGPT) 15 U/L      AST (SGOT) 22 U/L      Alkaline Phosphatase 46 U/L      Total Bilirubin 0.3 mg/dL      Globulin 2.8 gm/dL      A/G Ratio 1.4 g/dL      BUN/Creatinine Ratio 16.1     Anion Gap 10.5 mmol/L      eGFR 106.4 mL/min/1.73     Narrative:      GFR Normal >60  Chronic Kidney Disease <60  Kidney Failure <15      Lipase [[id number]]  (Normal) Collected: 06/01/23 1128    Specimen: Blood Updated: 06/01/23 1159     Lipase 17 U/L     CBC Auto Differential [[id number]]  (Abnormal) Collected: 06/01/23 1128    Specimen: Blood Updated: 06/01/23 1139     WBC 5.91 10*3/mm3      RBC 4.78 10*6/mm3      Hemoglobin 14.1 g/dL      Hematocrit 43.0 %      MCV 90.0 fL      MCH 29.5 pg      MCHC 32.8 g/dL      RDW 12.0 %      RDW-SD 39.9 fl      MPV 9.0 fL      Platelets 291 10*3/mm3      Neutrophil % 65.8 %      Lymphocyte % 27.7 %      Monocyte % 6.1 %      Eosinophil % 0.2 %      Basophil % 0.2 %      Immature Grans % 0.0 %      Neutrophils, Absolute 3.89 10*3/mm3      Lymphocytes, Absolute 1.64 10*3/mm3      Monocytes, Absolute 0.36 10*3/mm3      Eosinophils, Absolute 0.01 10*3/mm3      Basophils, Absolute 0.01 10*3/mm3      Immature Grans, Absolute 0.00 10*3/mm3      nRBC 0.0 /100 WBC     Urinalysis With Microscopic If Indicated (No Culture) - Urine, Clean Catch [[id number]]  (Abnormal) Collected: 06/01/23 1411    Specimen: Urine, Clean Catch Updated: 06/01/23 1430      Color, UA Yellow     Appearance, UA Clear     pH, UA 5.5     Specific Gravity, UA >1.030     Glucose, UA Negative     Ketones, UA Negative     Bilirubin, UA Negative     Blood, UA Negative     Protein, UA Negative     Leuk Esterase, UA Negative     Nitrite, UA Negative     Urobilinogen, UA 0.2 E.U./dL    Narrative:      Urine microscopic not indicated.           Imaging:    CT Abdomen Pelvis With Contrast    Result Date: 6/1/2023  PROCEDURE: CT ABDOMEN PELVIS W CONTRAST  COMPARISON: None  INDICATIONS: Mid to low abdominal pain, nausea, constipation  TECHNIQUE: After obtaining the patient's consent, CT images were created with non-ionic intravenous contrast material.   PROTOCOL:   Standard imaging protocol performed    RADIATION:   DLP: 898.3 mGy*cm   Automated exposure control was utilized to minimize radiation dose. CONTRAST: 100 cc Isovue 370 I.V.  FINDINGS:  The lung bases are clear.  There is a tiny right hepatic cyst.  The gallbladder, adrenal glands, kidneys, spleen, and pancreas are unremarkable.  The stomach appears normal.  The small bowel appears normal in caliber and configuration.  The colon appears normal.  The appendix appears normal.  There is no ascites or loculated collection.  No abnormally enlarged lymph nodes are identified.  The rectum is moderately distended with stool.  The prostate and urinary bladder are unremarkable.  No aggressive osseous lesions are identified.        1. No acute process identified within abdomen/pelvis. 2. Rectum moderately distended with stool.     SHARON BOSTON MD       Electronically Signed and Approved By: SHARON BOSTON MD on 6/01/2023 at 12:54                 Differential Diagnosis and Discussion:      Abdominal Pain: Based on the patient's signs and symptoms, I considered abdominal aortic aneurysm, small bowel obstruction, pancreatitis, acute cholecystitis, acute appendecitis, peptic ulcer disease, gastritis, colitis, endocrine disorders, irritable bowel  syndrome and other differential diagnosis an etiology of the patient's abdominal pain.    All labs were reviewed and interpreted by me.  CT scan radiology impression was interpreted by me.    MDM  Number of Diagnoses or Management Options  Constipation, unspecified constipation type  Diagnosis management comments: In summary this is a 47-year-old male patient presents to the emergency department for evaluation of abdominal pain, nausea vomiting and constipation.  He reports having just of small bowel movement today.  Reports feeling the need to have another bowel movement.  CBC independently reviewed by me and shows no critical abnormalities.  CMP independently reviewed by me and shows no critical abnormalities.  CT scan of the abdomen pelvis is unremarkable and negative for acute intra-abdominal pathology except for constipation.  Patient has been given glycerin suppository, magnesium citrate p.o., milk of molasses enema with improvement of his constipation.  Very strict return to ER and follow-up instructions have been provided to the patient.             Patient Care Considerations:    ANTIBIOTICS: I considered prescribing antibiotics as an outpatient however No antibiotic requiring condition identified      Consultants/Shared Management Plan:    None    Social Determinants of Health:    Patient is independent, reliable, and has access to care.       Disposition and Care Coordination:    Discharged: The patient is suitable and stable for discharge with no need for consideration of observation or admission.    I have explained the patient´s condition, diagnoses and treatment plan based on the information available to me at this time. I have answered questions and addressed any concerns. The patient has a good  understanding of the patient´s diagnosis, condition, and treatment plan as can be expected at this point. The vital signs have been stable. The patient´s condition is stable and appropriate for discharge  from the emergency department.      The patient will pursue further outpatient evaluation with the primary care physician or other designated or consulting physician as outlined in the discharge instructions. They are agreeable to this plan of care and follow-up instructions have been explained in detail. The patient has received these instructions in written format and have expressed an understanding of the discharge instructions. The patient is aware that any significant change in condition or worsening of symptoms should prompt an immediate return to this or the closest emergency department or call to 911.  I have explained discharge medications and the need for follow up with the patient/caretakers. This was also printed in the discharge instructions. Patient was discharged with the following medications and follow up:      Medication List      No changes were made to your prescriptions during this visit.      Emile Perdomo MD  [address] [address]  [phone]    In 1 week         Final diagnoses:   Constipation, unspecified constipation type        ED Disposition     ED Disposition   Discharge    Condition   Stable    Comment   --             This medical record created using voice recognition software.           Dimitris Garner MD  06/01/23 6077

## 2024-01-31 DIAGNOSIS — G47.30 OBSERVED SLEEP APNEA: Primary | ICD-10-CM

## 2024-01-31 DIAGNOSIS — G47.8 NON-RESTORATIVE SLEEP: ICD-10-CM

## 2024-01-31 DIAGNOSIS — F51.04 PSYCHOPHYSIOLOGICAL INSOMNIA: ICD-10-CM

## 2024-01-31 DIAGNOSIS — G47.10 HYPERSOMNIA: ICD-10-CM

## 2024-01-31 DIAGNOSIS — E66.9 CLASS 1 OBESITY: ICD-10-CM

## 2024-01-31 DIAGNOSIS — R06.83 SNORING: ICD-10-CM

## 2024-01-31 RX ORDER — ZOLPIDEM TARTRATE 5 MG/1
5 TABLET ORAL TAKE AS DIRECTED
Qty: 2 TABLET | Refills: 0 | Status: SHIPPED | OUTPATIENT
Start: 2024-01-31

## 2024-02-05 ENCOUNTER — TRANSCRIBE ORDERS (OUTPATIENT)
Dept: SLEEP MEDICINE | Facility: HOSPITAL | Age: 49
End: 2024-02-05
Payer: OTHER GOVERNMENT

## 2024-02-05 DIAGNOSIS — G47.33 OBSTRUCTIVE SLEEP APNEA (ADULT) (PEDIATRIC): Primary | ICD-10-CM

## 2024-03-21 ENCOUNTER — HOSPITAL ENCOUNTER (OUTPATIENT)
Dept: SLEEP MEDICINE | Facility: HOSPITAL | Age: 49
End: 2024-03-21
Payer: OTHER GOVERNMENT

## 2024-03-21 DIAGNOSIS — G47.33 OBSTRUCTIVE SLEEP APNEA (ADULT) (PEDIATRIC): ICD-10-CM

## 2024-03-21 PROCEDURE — 95810 POLYSOM 6/> YRS 4/> PARAM: CPT

## 2024-03-25 PROCEDURE — 95810 POLYSOM 6/> YRS 4/> PARAM: CPT | Performed by: INTERNAL MEDICINE
